# Patient Record
Sex: FEMALE | Race: ASIAN | Employment: OTHER | URBAN - METROPOLITAN AREA
[De-identification: names, ages, dates, MRNs, and addresses within clinical notes are randomized per-mention and may not be internally consistent; named-entity substitution may affect disease eponyms.]

---

## 2017-07-17 ENCOUNTER — MEDICAL CORRESPONDENCE (OUTPATIENT)
Dept: HEALTH INFORMATION MANAGEMENT | Facility: CLINIC | Age: 57
End: 2017-07-17

## 2018-07-11 ENCOUNTER — TRANSFERRED RECORDS (OUTPATIENT)
Dept: HEALTH INFORMATION MANAGEMENT | Facility: CLINIC | Age: 58
End: 2018-07-11

## 2018-07-18 DIAGNOSIS — H35.52 RETINITIS PIGMENTOSA: Primary | ICD-10-CM

## 2018-07-24 ENCOUNTER — HEALTH MAINTENANCE LETTER (OUTPATIENT)
Age: 58
End: 2018-07-24

## 2018-07-24 ENCOUNTER — ALLIED HEALTH/NURSE VISIT (OUTPATIENT)
Dept: OPHTHALMOLOGY | Facility: CLINIC | Age: 58
End: 2018-07-24
Attending: OPHTHALMOLOGY
Payer: COMMERCIAL

## 2018-07-24 DIAGNOSIS — H35.52 RETINITIS PIGMENTOSA: ICD-10-CM

## 2018-07-24 PROCEDURE — 40000269 ZZH STATISTIC NO CHARGE FACILITY FEE: Mod: ZF

## 2018-07-24 PROCEDURE — 92275 FFERG OU (BOTH EYES): CPT | Mod: ZF

## 2018-07-24 RX ORDER — IBUPROFEN 600 MG/1
600 TABLET, FILM COATED ORAL PRN
COMMUNITY

## 2018-07-24 RX ORDER — MECLIZINE HYDROCHLORIDE 25 MG/1
25 TABLET ORAL PRN
COMMUNITY
Start: 2018-04-19

## 2018-07-24 RX ORDER — ASPIRIN 81 MG/1
81 TABLET ORAL DAILY
COMMUNITY
Start: 2018-04-19

## 2018-07-24 RX ORDER — LOSARTAN POTASSIUM 25 MG/1
25 TABLET ORAL DAILY
COMMUNITY
Start: 2018-04-19

## 2018-07-24 RX ORDER — CYCLOBENZAPRINE HCL 5 MG
5-10 TABLET ORAL
COMMUNITY
Start: 2018-05-17

## 2018-07-24 RX ORDER — OMEPRAZOLE 40 MG/1
40 CAPSULE, DELAYED RELEASE ORAL
COMMUNITY
Start: 2018-01-08

## 2018-07-24 ASSESSMENT — VISUAL ACUITY
OS_SC: 20/150
OD_SC: 20/20
OS_SC+: -
OD_SC+: -
METHOD: SNELLEN - LINEAR

## 2018-07-24 NOTE — PROGRESS NOTES
"2018    STANDARD ERG REPORT    Referring:  Dr. Adarsh Romero [facility]     RE:  Lona PARKS Cha  MRN:  [id number]  :  1960    ERG Date:  2018    CLINICAL HISTORY: Referred by Dr. Adarsh Romero to Dr. Doris Arcos/[facility] for retinal evaluation of extensive retinal scarring both eyes.  Here for ffERG because of \"retinal pigmentary changes concerning for retinitis pigmentosa\".   Pt states that she has had dizziness with headaches and nausea and vomiting x 3wks now.  She has been prescribed Meclizine which helps only if she can take the med as soon as she thinks she is feeling dizzy.  Pt is vague historian but believes vision was \"good\" and only recently noted she could not see out of left eye. No FHx eye probs.     IMPRESSION: Asymmetric Cone-vanda dystrophy                Visual Acuity Right Eye : 20/20-       w/o gls    Visual Acuity Left Eye : 20/150-, PHNI      w/o gls                         ALL AVERAGED  Data for Full-Field ERG Right Eye   Left Eye    Dark-Adapted Patient Normal Patient   0.01 ERG (vanda) amplitude( v) 121 101.6-336.1 97   0.01 ERG (vanda) implicit time(ms) 84.9 73.2-106.2 94.9   MMMMM      3.0 ERG (combined) a-wave amplitude( v) -130 -218.0 to -67.8 -99   3.0 ERG (combined) a-wave implicit time(ms) 22.5 13.0-22.8 23.3   3.0 ERG (combined) b-wave amplitude( v) 200* 124.1-414.8 134*   3.0 ERG (combined) b-wave implicit time(ms) 60.7* 29.7-52.8 56.6*   MMMMM      3.0 Oscillatory Potentials   Present     Light-Adapted      3.0 Flicker (30-Hz) amplitude( v) 77 77.8-138.3 54   3.0 Flicker (30-Hz) implicit time(ms) 30.8 23.9-28.3 31.6         3.0 ERG (cone) a-wave amplitude( v) -34 -59.4 to -9.1 -21   3.0 ERG (cone) a-wave implicit time(ms) 15 16.2-18.0 15   3.0 ERG (cone) b-wave amplitude( v) 80 52.7-185.6 70   3.0 ERG (cone) b-wave implicit time(ms) 32.5 26.2-31.3 34.9      *=manipulated cursors    INTERPRETATION:      This full field " electroretinogram was performed according to ISCEV standards using Appy CoupleION E3 system and DTL fiber recording electrodes. The patient tolerated the testing well.  The waveforms are fairly reproducible and well formed. The normative values provided above represent the 95% confidence limits for a normal individual the age of the patient. The patient s responses are averaged. There is some asymmetry of the responses in between both eyes with right eye having greater amplitude responses compared to left eye.    In dark adapted conditions, the vanda-specific responses have normal amplitude and normal implicit time for right eye and decreased amplitude with normal implicit time left eye.  The maximal response, a combined vanda and cone responses, have normal amplitudes in both eyes and the implicit time is delayed for the b-wave both eyes and a-wave left eye.  With a higher intensity flash, the responses improve, but persistently reduced in both eyes.  The bright flash (scotopic 10.0) response is not electronegative. The oscillatory potentials are present bilaterally.      In light adapted conditions, the 30-Hz flicker responses have a decreased amplitude in both eyes and the implicit times are delayed.    The single photopic response has normal amplitude both eyes and and the implicit times are abnormal in both eyes.    Conclusion:  This electroretinogram is abnormal, showing moderate loss of cone/vanda function. The etiology for this is unknown and could be consistent with cone-vanda retinal dystrophy. The differential diagnosis includes: post-inflammatory retinopathy, toxic retinopathy and Autoimmune Retinopathy. Consideration could be given to drawing blood for the retinal dystrophy panel, with hopes of determining the mutations accounting for this retinal dystrophy. Clinical correlation is recommended.    A  repeat electroretinogram could be considered in 1-2 years or earlier if the clinical situation changes.     Tanika George,  thank you for the opportunity to provide electrophysiologic services for this patient.  Please do not hesitate to call if there should be any questions regarding these results.    Magaly Nunez MD     Retina Service   Department of Ophthalmology & Visual Neurosciences   [facility]  Phone: [phone]   Fax: [phone]

## 2018-07-24 NOTE — MR AVS SNAPSHOT
After Visit Summary   7/24/2018    Lona PARKS Cha    MRN: [id number]           Patient Information     Date Of Birth          1960        Visit Information        Provider Department      7/24/2018 8:15 AM PHONE, ; [facility] EYE ELECTRODIAGNOSTIC [facility]        Today's Diagnoses     Retinitis pigmentosa           Follow-ups after your visit        Who to contact     Please call your clinic at [phone] to:    Ask questions about your health    Make or cancel appointments    Discuss your medicines    Learn about your test results    Speak to your doctor            Additional Information About Your Visit        MyChart Information     FirstString gives you secure access to your electronic health record. If you see a primary care provider, you can also send messages to your care team and make appointments. If you have questions, please call your primary care clinic.  If you do not have a primary care provider, please call [phone] and they will assist you.      FirstString is an electronic gateway that provides easy, online access to your medical records. With FirstString, you can request a clinic appointment, read your test results, renew a prescription or communicate with your care team.     To access your existing account, please contact your [facility] or call [phone] for assistance.        Care EveryWhere ID     This is your Care EveryWhere ID. This could be used by other organizations to access your [place] medical records  [id number]         Blood Pressure from Last 3 Encounters:   No data found for BP    Weight from Last 3 Encounters:   No data found for Wt              We Performed the Following     FFERG OU (both eyes)        Primary Care Provider Office Phone # Fax #    Gordy Maloney [phone]       [facility] [address]                                     [facility] [address]        Equal Access to Services      YULI SHARMA : Hadii aad jimena malissa Murdock, wasusanda luqadaha, qaybta kaalmada deanthaniariki, briana bricenopericotoby davis. So [facility] [phone].    ATENCIÓN: Si habla español, tiene a crawley disposición servicios gratuitos de asistencia lingüística. Llame al [phone].    We comply with applicable federal civil rights laws and [place] laws. We do not discriminate on the basis of race, color, national origin, age, disability, sex, sexual orientation, or gender identity.            Thank you!     Thank you for choosing EYE CLINIC  for your care. Our goal is always to provide you with excellent care. Hearing back from our patients is one way we can continue to improve our services. Please take a few minutes to complete the written survey that you may receive in the mail after your visit with us. Thank you!             Your Updated Medication List - Protect others around you: Learn how to safely use, store and throw away your medicines at www.disposemymeds.org.      Notice  As of 7/24/2018 10:41 AM    You have not been prescribed any medications.

## 2018-07-24 NOTE — LETTER
"2018    STANDARD ERG REPORT    Referring:  Dr. Adarsh [facility]     RE:  Lona CERON Cha  MRN:  [id number]  :  1960    ERG Date:  2018    CLINICAL HISTORY: Referred by Dr. Adarsh Romero [facility] evaluation of extensive retinal scarring both eyes.  Here for ffERG because of \"retinal pigmentary changes concerning for retinitis pigmentosa\".   Pt states that she has had dizziness with headaches and nausea and vomiting x 3wks now.  She has been prescribed Meclizine which helps only if she can take the med as soon as she thinks she is feeling dizzy.  Pt is vague historian but believes vision was \"good\" and only recently noted she could not see out of left eye. No FHx eye probs.     IMPRESSION:  Asymmetric cone-vanda dystrophy                Visual Acuity Right Eye: 20/20-       w/o gls    Visual Acuity Left Eye:              20/150-, PHNI      w/o gls                                                ALL AVERAGED  Data for Full-Field ERG Right Eye   Left Eye    Dark-Adapted Patient Normal Patient   0.01 ERG (vanda) amplitude( v) 121 101.6-336.1 97   0.01 ERG (vanda) implicit time(ms) 84.9 73.2-106.2 94.9   MMMMM      3.0 ERG (combined) a-wave amplitude( v) -130 -218.0 to -67.8 -99   3.0 ERG (combined) a-wave implicit time(ms) 22.5 13.0-22.8 23.3   3.0 ERG (combined) b-wave amplitude( v) 200* 124.1-414.8 134*   3.0 ERG (combined) b-wave implicit time(ms) 60.7* 29.7-52.8 56.6*   MMMMM      3.0 Oscillatory Potentials  Present  Present Present     Light-Adapted      3.0 Flicker (30-Hz) amplitude( v) 77 77.8-138.3 54   3.0 Flicker (30-Hz) implicit time(ms) 30.8 23.9-28.3 31.6         3.0 ERG (cone) a-wave amplitude( v) -34 -59.4 to -9.1 -21   3.0 ERG (cone) a-wave implicit time(ms) 15 16.2-18.0 15   3.0 ERG (cone) b-wave amplitude( v) 80 52.7-185.6 70   3.0 ERG (cone) b-wave implicit time(ms) 32.5 26.2-31.3 34.9                 " *=manipulated cursors    INTERPRETATION:  This full-field electroretinogram was performed according to ISCEV standards using the HardDronesION E3 system and DTL fiber-recording electrodes. The patient tolerated the testing well. The waveforms are fairly reproducible and well formed. The normative values provided above represent the 95% confidence limits for a normal individual the age of the patient. The patient s responses are averaged. There is some asymmetry of the responses in between both eyes with right eye having greater amplitude responses compared to left eye.    In dark-adapted conditions, the vanda-specific responses have normal amplitude and normal implicit time for right eye and decreased amplitude with normal implicit time left eye. The maximal response, a combined vanda and cone response, has normal amplitudes in both eyes and the implicit time is delayed for the b-wave both eyes and a-wave left eye. With a higher intensity flash, the responses improve but are persistently reduced in both eyes. The bright flash (scotopic 10.0) response is not electronegative. The oscillatory potentials are present bilaterally.      In light-adapted conditions, the 30-Hz flicker responses have a decreased amplitude in both eyes and the implicit times are delayed.  The single photopic response has normal amplitude both eyes and the implicit times are abnormal in both eyes.    Conclusion: This electroretinogram is abnormal, showing moderate loss of cone/vanda function. The etiology for this is unknown and could be consistent with cone-vanda retinal dystrophy. The differential diagnoses include: Post-inflammatory retinopathy, toxic retinopathy, and autoimmune retinopathy. Consideration could be given to drawing blood for the retinal dystrophy panel, with hopes of determining the mutations accounting for this retinal dystrophy. Clinical correlation is recommended.    A  repeat electroretinogram could be considered in 1-2 years, or  earlier if the clinical situation changes.     Doris, thank you for the opportunity to provide electrophysiologic services for this patient.  Please do not hesitate to call if there should be any questions regarding these results.    Sincerely,    Magaly Nunez MD     Retina Service   Department of Ophthalmology & Visual Neurosciences   [facility]  Phone: [phone]   Fax: [phone]

## 2018-07-24 NOTE — NURSING NOTE
"Referred by Dr. Adarsh Romero to Dr. Doris Arcos/[facility] for retinal evaluation of extensive retinal scarring both eyes.  See Outside Outpt notes.  Here for ffERG because of \"retinal pigmentary changes concerning for retinitis pigmentosa\".   No prev gls and only using OTC readers at this time x1 1/2 yrs.  Dr. Romero did give gls rx but pt unsure if gls will help her visual problems.  Pt states that she has had dizziness w/headaches and nausea and vomiting x 3wks now.  She has been rxd Meclizine which helps only if she can take the med as soon as she thinks she is feeling dizzy.  Otherwise, episodes can last 3-4 days.  Pt is vague historian but believes vision was \"good\" and only recently noted she could not see out of left eye.  No probs w/renewing DL.  Parents  when she was very young and states -FHx eye probs.  Accompanied by .  Declined [facility]  and form signed and sent to HIM.  Signed up for [employer] today.  No f/u yet scheduled w/Dr. Arcos; will await results.  "

## 2018-10-10 DIAGNOSIS — H35.52 CONE-ROD DYSTROPHY: Primary | ICD-10-CM

## 2018-10-11 ENCOUNTER — TRANSFERRED RECORDS (OUTPATIENT)
Dept: HEALTH INFORMATION MANAGEMENT | Facility: CLINIC | Age: 58
End: 2018-10-11

## 2018-10-11 ENCOUNTER — MEDICAL CORRESPONDENCE (OUTPATIENT)
Dept: HEALTH INFORMATION MANAGEMENT | Facility: CLINIC | Age: 58
End: 2018-10-11

## 2018-10-11 ENCOUNTER — OFFICE VISIT (OUTPATIENT)
Dept: OPHTHALMOLOGY | Facility: CLINIC | Age: 58
End: 2018-10-11
Attending: OPHTHALMOLOGY
Payer: COMMERCIAL

## 2018-10-11 DIAGNOSIS — H35.52 CONE-ROD DYSTROPHY: Primary | ICD-10-CM

## 2018-10-11 DIAGNOSIS — H35.50 RETINAL DYSTROPHY: ICD-10-CM

## 2018-10-11 PROCEDURE — 92134 CPTRZ OPH DX IMG PST SGM RTA: CPT | Mod: ZF | Performed by: OPHTHALMOLOGY

## 2018-10-11 PROCEDURE — 92235 FLUORESCEIN ANGRPH MLTIFRAME: CPT | Mod: ZF | Performed by: OPHTHALMOLOGY

## 2018-10-11 PROCEDURE — 92083 EXTENDED VISUAL FIELD XM: CPT | Mod: ZF | Performed by: OPHTHALMOLOGY

## 2018-10-11 PROCEDURE — G0463 HOSPITAL OUTPT CLINIC VISIT: HCPCS | Mod: 25,ZF

## 2018-10-11 PROCEDURE — 92250 FUNDUS PHOTOGRAPHY W/I&R: CPT | Mod: ZF | Performed by: OPHTHALMOLOGY

## 2018-10-11 RX ORDER — ACETAMINOPHEN 500 MG/1
TABLET ORAL
Refills: 0 | COMMUNITY
Start: 2018-08-31

## 2018-10-11 ASSESSMENT — VISUAL ACUITY
CORRECTION_TYPE: GLASSES
OS_SC: 20/300
OD_PH_SC: 20/25-2
OS_PH_SC: 20/250
METHOD: SNELLEN - LINEAR
OD_SC: 20/40

## 2018-10-11 ASSESSMENT — CONF VISUAL FIELD
OS_SUPERIOR_TEMPORAL_RESTRICTION: 1
OD_NORMAL: 1
OS_SUPERIOR_NASAL_RESTRICTION: 1
OS_INFERIOR_TEMPORAL_RESTRICTION: 1
METHOD: COUNTING FINGERS
OS_INFERIOR_NASAL_RESTRICTION: 1

## 2018-10-11 ASSESSMENT — TONOMETRY
OD_IOP_MMHG: 21
OS_IOP_MMHG: 18
IOP_METHOD: TONOPEN

## 2018-10-11 ASSESSMENT — EXTERNAL EXAM - LEFT EYE: OS_EXAM: NORMAL

## 2018-10-11 ASSESSMENT — EXTERNAL EXAM - RIGHT EYE: OD_EXAM: NORMAL

## 2018-10-11 ASSESSMENT — SLIT LAMP EXAM - LIDS
COMMENTS: DERMATOCHALASIS
COMMENTS: DERMATOCHALASIS

## 2018-10-11 NOTE — MR AVS SNAPSHOT
After Visit Summary   10/11/2018    Lona PARKS Cha    MRN: [id number]           Patient Information     Date Of Birth          1960        Visit Information        Provider Department      10/11/2018 8:15 AM Magaly Nunez MD [facility]        Today's Diagnoses     Cone-vanda dystrophy    -  1    Retinal dystrophy           Follow-ups after your visit        Additional Services     Low Vision (OT) Referral       If you have not heard from the scheduling office within 2 business days, please call [phone] for all locations, with the exception of [place], please call [phone] and [place], please call [phone].    Please be aware that coverage of these services is subject to the terms and limitations of your health insurance plan.  Call member services at your health plan with any benefit or coverage questions.            Ophthalmology Genetic Clinic Referral       Retinitis pigmentosa                  Follow-up notes from your care team     Return in about 4 months (around 2/11/2019) for OCT AF.      Future tests that were ordered for you today     Open Future Orders        Priority Expected Expires Ordered    Low Vision (OT) Referral Routine  10/11/2019 10/11/2018    Ophthalmology Genetic Clinic Referral Routine  10/11/2019 10/11/2018    M Tuberculosis by Quantiferon Routine  1/9/2019 10/11/2018    Angiotensin converting enzyme Routine  1/9/2019 10/11/2018            Who to contact     Please call your clinic at [phone] to:    Ask questions about your health    Make or cancel appointments    Discuss your medicines    Learn about your test results    Speak to your doctor            Additional Information About Your Visit        MyChart Information     [employer]any[employer] gives you secure access to your electronic health record. If you see a primary care provider, you can also send messages to your care team and make appointments. If you have questions, please call your primary care  clinic.  If you do not have a primary care provider, please call [phone] and they will assist you.      [employer] is an electronic gateway that provides easy, online access to your medical records. With [employer], you can request a clinic appointment, read your test results, renew a prescription or communicate with your care team.     To access your existing account, please contact your [facility] or call [phone] for assistance.        Care EveryWhere ID     This is your Care EveryWhere ID. This could be used by other organizations to access your [place] medical records  [id number]         Blood Pressure from Last 3 Encounters:   No data found for BP    Weight from Last 3 Encounters:   No data found for Wt              We Performed the Following     Fluorescein Angiography OU (both eyes)     Fundus Autofluorescence Image (FAF) OU (both eyes)     Fundus Photos OU (both eyes)     Atkinson VF OU     OCT Retina Spectralis OU (both eyes)        Primary Care Provider Office Phone # Fax #    Gordy Maloney [phone]       [facility] [address]                                     [facility] [address]        Equal Access to Services     YULI SHARMA : Hadii aad ku hadasho Soomaali, waaxda luqadaha, qaybta kaalmada adecarina, briana davis. So [facility] [phone].    ATENCIÓN: Si habla español, tiene a crawley disposición servicios gratuitos de asistencia lingüística. Claudia al [phone].    We comply with applicable federal civil rights laws and [place] laws. We do not discriminate on the basis of race, color, national origin, age, disability, sex, sexual orientation, or gender identity.            Thank you!     Thank you for choosing EYE CLINIC  for your care. Our goal is always to provide you with excellent care. Hearing back from our patients is one way we can continue to improve our services. Please take a few  minutes to complete the written survey that you may receive in the mail after your visit with us. Thank you!             Your Updated Medication List - Protect others around you: Learn how to safely use, store and throw away your medicines at www.disposemymeds.org.          This list is accurate as of 10/11/18  5:30 PM.  Always use your most recent med list.                   Brand Name Dispense Instructions for use Diagnosis    aspirin 81 MG EC tablet      Take 81 mg by mouth daily        CENTRUM SILVER 50+WOMEN PO      Take 1 tablet by mouth daily        cyclobenzaprine 5 MG tablet    FLEXERIL     Take 5-10 mg by mouth nightly as needed for other Take when having trouble falling asleep.        HM PAIN RELIEF EXTRA STRENGTH 500 MG tablet   Generic drug:  acetaminophen      TAKE 1 2 TABLETS  500 1,000 MG  BY MOUTH THREE TIMES A DAY AS NEEDED FOR PAIN FEVER        ibuprofen 600 MG tablet    ADVIL/MOTRIN     Take 600 mg by mouth as needed for pain Use for body ache and pain d/t carpal tunnel and bad right hip and general body aches.        losartan 25 MG tablet    COZAAR     Take 25 mg by mouth daily        meclizine 25 MG tablet    ANTIVERT     Take 25 mg by mouth as needed for dizziness, headaches, nausea or vomiting        metFORMIN 1000 MG tablet    GLUCOPHAGE     Take 1,000 mg by mouth 2 times daily (with meals)        omeprazole 40 MG capsule    priLOSEC     Take 40 mg by mouth daily (with breakfast)

## 2018-10-11 NOTE — LETTER
10/11/2018       RE: Lona PARKS Cha  [address]  [place] [address]     Dear Doris,    Thank you for referring your patient, Lona PARKS Cha, to the EYE CLINIC at [facility]. Please see a copy of my visit note below.    CC: Retinitis pigmentosa referral - initial visit    HPI: Lona PARKS Cha is a  58 year old year-old patient with history of Diabetes mellitus and HTN who presents from Dr. Henriquez for evaluation of retinal dystrophy. Patient reports decreased vision (left worse than right) for the past 10 months. Reports vision was normal prior to this.   Reports nyctalopia which has been noticeable and worse this year. Reports flashes and floaters which also started around months ago. Reports some weight loss (~10 lbs) since Feb 2018. Reports some fatigue and tiredness. Reports vertigo.    Patient is unaware of any family history of any eye problems.  Has seven kids, no vision problems in kids so far.  No history of cancer or tumors.  No history of seizures or HCQ use.    Optos image consistent with exam    Goldmann visual field (GVF)  Right eye: constricted visual field to 30 degrees   Left eye: severely constricted visual field to less than 10 degrees    FfERG: Abnormal with moderate loss of cone/vanda function    OCT   RE: Retinal thinning with loss of ellipsoid zone, preserved sub foveally  LE: Severe retinal thinning, outer retinal loss including sub foveal area    Autofluorescence:  Right eye:  Large areas of hypoautofluorescence peripapillary and along the inferior arcade, fine area of hyperautofluorescence in the macula with patchy hypo/hyper AF   Left eye: Large areas of hypoautofluorescence peripapillary and along the superior and inferior arcade, patchy hypo/hyper ring around the fovea, patchy hypo/hyper Autofluorescence elsewhere    fluorescein angiography 10-11-18  Right eye: normal AV and choroidal filling; dark choroid; PP and along the inferior temporal arcade late  hyperfluorescence consistent with window's defects  Left eye: normal AV and choroidal filling; dark choroid; PP and macula late hyperfluorescence consistent with window's defects. Central foveal hypofluorescence with bull's eye appearance     Assessment & Plan:  1. Retinal dystrophy, both eyes  differential diagnosis: cone-vanda dystrophy. Stargards, Retinitis pigmentosa; inverse Retinitis pigmentosa.    - plan for RPE65 genetic test today    - Establish care with genetic counselor- consider NGS    -  recommend Low vision with Melanie Mckeon   - given late onset of symptoms ( per patient in the past year) will plan for basic lab work up for FTA- ABS, TB quantiferon, ACE to rule out other infectious or inflammatory conditions, although unlikely     Follow up in approximately 4 months with Optical Coherence Tomography, Autofluorescence and to discuss results     Arian Todd MD  PGY-3 Ophthalmology    ATTESTATION   Attending Attestation: Complete documentation of historical and exam elements from today's encounter can be found in the full encounter summary report (not reduplicated in this progress note).  I personally obtained the chief complaint(s) and history of present illness.  I confirmed and edited as necessary the review of systems, past medical/surgical history, family history, social history, and examination findings as documented by others; and I examined the patient myself.  I personally reviewed the relevant tests, images, and reports as documented above.  I formulated and edited as necessary the assessment and plan and discussed the findings and management plan with the patient and family. Magaly Nunez MD    Again, thank you for allowing me to participate in the care of your patient.      Sincerely,    Magaly Nunez MD     Retina Service   Department of Ophthalmology and Visual Neurosciences   [facility]  Phone:  [phone]   Fax:  [phone]

## 2018-10-11 NOTE — NURSING NOTE
Chief Complaints and History of Present Illnesses   Patient presents with     Retinal Evaluation     Pt sent for evaluation for Retinal Dystrophy by Dr. Doris Henriquez     HPI    Symptoms:     Floaters   Flashes   No redness   Tearing   Dryness         Do you have eye pain now?:  No      Comments:  Pt sent for evaluation for Retinal Dystrophy BE by Dr. Doris Henriquez. Pt feels like vision is getting worse and worse LE>RE x 2 months. Pt does note some intermittent flashing lights floaters in vision BE x few months (just this year). Pt also c/o intermittent dryness and tearing (especially when the wind hits the eyes) x new just starting this year.     Most current A1C pt does not know, unable to pull up    Kamryn Venegas, [facility] 8:44 AM October 11, 2018

## 2018-10-11 NOTE — PROGRESS NOTES
CC: Retinitis pigmentosa referral - initial visit    HPI: May N Betsy is a  58 year old year-old patient with history of Diabetes mellitus and HTN who presents from Dr. Henriquez for evaluation of retinal dystrophy. Patient reports decreased vision (left worse than right) for the past 10 months. Reports vision was normal prior to this.   Reports nyctalopia which has been noticeable and worse this year. Reports flashes and floaters which also started around months ago. Reports some weight loss (~10 lbs) since Feb 2018. Reports some fatigue and tiredness. Reports vertigo.    Patient is unaware of any family history of any eye problems.  Has seven kids, no vision problems in kids so far.  No history of cancer or tumors.  No history of seizures or HCQ use.    Optos image consistent with exam    Goldmann visual field (GVF)  Right eye: constricted visual field to 30 degrees   Left eye: severely constricted visual field to less than 10 degrees    FfERG: Abnormal with moderate loss of cone/vanda function    OCT   RE: Retinal thinning with loss of ellipsoid zone, preserved sub foveally  LE: Severe retinal thinning, outer retinal loss including sub foveal area    Autofluorescence:  Right eye:  Large areas of hypoautofluorescence peripapillary and along the inferior arcade, fine area of hyperautofluorescence in the macula with patchy hypo/hyper AF   Left eye: Large areas of hypoautofluorescence peripapillary and along the superior and inferior arcade, patchy hypo/hyper ring around the fovea, patchy hypo/hyper Autofluorescence elsewhere    fluorescein angiography 10-11-18  Right eye: normal AV and choroidal filling; dark choroid; PP and along the inferior temporal arcade late hyperfluorescence consistent with window's defects  Left eye: normal AV and choroidal filling; dark choroid; PP and macula late hyperfluorescence consistent with window's defects. Central foveal hypofluorescence with bull's eye appearance     Assessment &  Plan:  1. Retinal dystrophy, both eyes  differential diagnosis: cone-vanda dystrophy. Stargards, Retinitis pigmentosa; inverse Retinitis pigmentosa.    - plan for RPE65 genetic test today    - Establish care with genetic counselor- consider NGS    -  recommend Low vision with Melanie Mckeon   - given late onset of symptoms ( per patient in the past year) will plan for basic lab work up for FTA- ABS, TB quantiferon, ACE to rule out other infectious or inflammatory conditions, although unlikely     Follow up in approximately 4 months with Optical Coherence Tomography, Autofluorescence and to discuss results     Arian Todd MD  PGY-3 Ophthalmology  ~~~~~~~~~~~~~~~~~~~~~~~~~~~~~~~~~~   Complete documentation of historical and exam elements from today's encounter can be found in the full encounter summary report (not reduplicated in this progress note).  I personally obtained the chief complaint(s) and history of present illness.  I confirmed and edited as necessary the review of systems, past medical/surgical history, family history, social history, and examination findings as documented by others; and I examined the patient myself.  I personally reviewed the relevant tests, images, and reports as documented above.  I personally reviewed the ophthalmic test(s) associated with this encounter, agree with the interpretation(s) as documented by the resident/fellow, and have edited the corresponding report(s) as necessary.   I formulated and edited as necessary the assessment and plan and discussed the findings and management plan with the patient and family    Magaly Nunez MD   of Ophthalmology.  Retina Service   Department of Ophthalmology and Visual Neurosciences   [facility]  Phone: [phone]   Fax: [phone]

## 2019-02-14 DIAGNOSIS — H35.50 RETINAL DYSTROPHY: Primary | ICD-10-CM

## 2019-03-13 ENCOUNTER — OFFICE VISIT (OUTPATIENT)
Dept: OPHTHALMOLOGY | Facility: CLINIC | Age: 59
End: 2019-03-13
Attending: OPHTHALMOLOGY
Payer: COMMERCIAL

## 2019-03-13 ENCOUNTER — OFFICE VISIT (OUTPATIENT)
Dept: OPHTHALMOLOGY | Facility: CLINIC | Age: 59
End: 2019-03-13
Attending: GENETIC COUNSELOR, MS
Payer: COMMERCIAL

## 2019-03-13 DIAGNOSIS — H35.50 RETINAL DYSTROPHY: ICD-10-CM

## 2019-03-13 DIAGNOSIS — H35.50 RETINAL DYSTROPHY: Primary | ICD-10-CM

## 2019-03-13 DIAGNOSIS — H35.52 CONE-ROD DYSTROPHY: ICD-10-CM

## 2019-03-13 PROCEDURE — G0463 HOSPITAL OUTPT CLINIC VISIT: HCPCS | Mod: ZF

## 2019-03-13 PROCEDURE — 92134 CPTRZ OPH DX IMG PST SGM RTA: CPT | Mod: ZF | Performed by: OPHTHALMOLOGY

## 2019-03-13 PROCEDURE — 96040 ZZH GENETIC COUNSELING, EACH 30 MINUTES: CPT | Mod: ZF | Performed by: GENETIC COUNSELOR, MS

## 2019-03-13 PROCEDURE — 92250 FUNDUS PHOTOGRAPHY W/I&R: CPT | Mod: ZF | Performed by: OPHTHALMOLOGY

## 2019-03-13 ASSESSMENT — EXTERNAL EXAM - LEFT EYE: OS_EXAM: NORMAL

## 2019-03-13 ASSESSMENT — VISUAL ACUITY
OD_PH_SC: 20/30
OS_PH_SC: 20/150
METHOD: SNELLEN - LINEAR
OD_SC: 20/40
OS_SC: 20/300

## 2019-03-13 ASSESSMENT — CONF VISUAL FIELD
OD_INFERIOR_NASAL_RESTRICTION: 3
OS_INFERIOR_NASAL_RESTRICTION: 3
OS_INFERIOR_TEMPORAL_RESTRICTION: 1
OS_SUPERIOR_TEMPORAL_RESTRICTION: 3

## 2019-03-13 ASSESSMENT — TONOMETRY
IOP_METHOD: TONOPEN
OD_IOP_MMHG: 17
OS_IOP_MMHG: 15

## 2019-03-13 ASSESSMENT — SLIT LAMP EXAM - LIDS
COMMENTS: DERMATOCHALASIS
COMMENTS: DERMATOCHALASIS

## 2019-03-13 ASSESSMENT — EXTERNAL EXAM - RIGHT EYE: OD_EXAM: NORMAL

## 2019-03-13 NOTE — PROGRESS NOTES
CC: Retinitis pigmentosa referral - initial visit    HPI: May N Betsy is a  58 year old year-old patient with history of Diabetes mellitus and HTN who presents from Dr. Henriquez for evaluation of retinal dystrophy. Patient reports decreased vision (left worse than right) for the past 10 months. Reports vision was normal prior to this.   Reports nyctalopia which has been noticeable and worse this year. Reports flashes and floaters which also started around months ago. Reports some weight loss (~10 lbs) since Feb 2018. Reports some fatigue and tiredness. Reports vertigo.    Patient is unaware of any family history of any eye problems.  Has seven kid  First kid had history of possible Rb. Patient reports L eye was removed  No history of cancer or tumors.  No history of seizures or HCQ use.    Optos image consistent with exam    Goldmann visual field (GVF)  Right eye: constricted visual field to 30 degrees   Left eye: severely constricted visual field to less than 10 degrees    FfERG: Abnormal with moderate loss of cone/vanda function    OCT   3-13-19  RE: Retinal thinning with loss of ellipsoid zone, preserved sub foveally  LE: Severe retinal thinning, outer retinal loss including sub foveal area    Autofluorescence:  3-13-19  Right eye:  Large areas of hypoautofluorescence peripapillary and along the inferior arcade, fine area of hyperautofluorescence in the macula with patchy hypo/hyper AF   Left eye: Large areas of hypoautofluorescence peripapillary and along the superior and inferior arcade, patchy hypo/hyper ring around the fovea, patchy hypo/hyper Autofluorescence elsewhere    Stable autofluorescence     fluorescein angiography 10-11-18  Right eye: normal AV and choroidal filling; dark choroid; PP and along the inferior temporal arcade late hyperfluorescence consistent with window's defects  Left eye: normal AV and choroidal filling; dark choroid; PP and macula late hyperfluorescence consistent with window's defects.  Central foveal hypofluorescence with bull's eye appearance     Assessment & Plan:  1. Retinal dystrophy, both eyes  differential diagnosis: cone-vanda dystrophy. Stargards, Retinitis pigmentosa; inverse Retinitis pigmentosa.    - plan for RPE65 genetic test today    - Establish care with genetic counselor- consider NGS    -  recommend Low vision with Melanie Mckeon   - given late onset of symptoms ( per patient in the past year) will plan for basic lab work up for FTA- ABS, TB quantiferon, ACE to rule out other infectious or inflammatory conditions, although unlikely. These tests were ordered before but not performed    Follow up in approximately 4 months with Optical Coherence Tomography, Autofluorescence and to discuss results       ~~~~~~~~~~~~~~~~~~~~~~~~~~~~~~~~~~   Complete documentation of historical and exam elements from today's encounter can be found in the full encounter summary report (not reduplicated in this progress note).  I personally obtained the chief complaint(s) and history of present illness.  I confirmed and edited as necessary the review of systems, past medical/surgical history, family history, social history, and examination findings as documented by others; and I examined the patient myself.  I personally reviewed the relevant tests, images, and reports as documented above.  I personally reviewed the ophthalmic test(s) associated with this encounter, agree with the interpretation(s) as documented by the resident/fellow, and have edited the corresponding report(s) as necessary.   I formulated and edited as necessary the assessment and plan and discussed the findings and management plan with the patient and family    Magaly Nunez MD   of Ophthalmology.  Retina Service   Department of Ophthalmology and Visual Neurosciences   [facility]  Phone: [phone]   Fax: [phone]

## 2019-03-13 NOTE — NURSING NOTE
Chief Complaints and History of Present Illnesses   Patient presents with     Follow Up     5 month follow up Retinal dystrophy, both eyes     Chief Complaint(s) and History of Present Illness(es)     Follow Up     Associated symptoms: Negative for flashes, floaters, eye pain, dryness and redness    Comments: 5 month follow up Retinal dystrophy, both eyes              Comments     Pt states vision is about the same as last visit. No eye pain today.  DM2 BS: 140 this morning.  A1C: good per pt.  No results found for: A1C    Anastacio KAPOOR March 13, 2019 12:20 PM

## 2019-03-13 NOTE — PROGRESS NOTES
"Lona Meyer was seen for a genetic counseling appointment in coordination with Dr. Nunez today given her history of retinal dystrophy.      Pertinent Medical History: May is a 58 year old female with a recent diagnosis of retinal dystrophy. She first began noticing symptoms sometime last summer, and reports her night vision is very poor. Ff ERG was abnormal with moderate loss of cone/vanda function; OCT showed retinal thinning bilaterally, more severe on the left, and autoflourescence showed large areas of hypo-autofluorescence peripapillary.  She also reports that she has insomnia and feels that she has poor mental sharpness and recall. She is otherwise healthy and denies hearing loss, renal abnormalities, or birth defects. See Dr. Nunez's note for additional details.     Family History: A three generation pedigree was obtained today and scanned into the EMR. The following information is significant:  -There is no other family history of retinal changes similar to May. Family history is also negative for birth defects, known genetic conditions, and consanguinity was denied.  -May's son, who is now 40, was diagnosed with \"cancer of the eye\" when he was around 6 months old, and he immediately went in for surgery to have his left eye removed. May does not think retinoblastoma sounds familiar, and she personally believes the diagnosis was made up, and that the doctors were punishing her for having had a child as a teenager, out of wedlock. I did review with May that the age of onset and treatment does sound consistent with RB, but that we would need to review medical records to confirm. This diagnosis and surgery took place in [place], [place]. Her son has some possible mental health concerns now, and does not follow with an ophthalmologist or any other doctor.         Discussion: We reviewed the genetics and inheritance of retinal dystopies, and genetic testing for the condition.     We have around 20,000 genes in " our body, and inherit one copy of each of these genes from our mother, and one from our father, and thus have two copies of each of these genes in every cell of our body. Genes tell our bodies how to grow and develop, help determine traits like eye and hair color, and help protect and make us susceptible to different diseases and genetic conditions, such as retinal dystrophy.     There are hundreds of genes that have been associated with retinal dystrophies. These genes can be inherited in an autosomal recessive, dominant, or X-linked manner. These genes can cause non-syndromic retinal dystrophy, meaning no other symptoms aside from the eye changes are present, or can by syndromic, meaning other symptoms, such as hearing loss, kidney problems, or birth defects can be present.      There are over 300 genes that have been associated with retinal dystrophy. We reviewed that sometimes, physical exam and/or family history can help us narrow down which gene, or group of genes, are more likely to be the answer. However, May has no family history of disease and her symptoms are quite unique and don't fit well into a specific genetic etiology. As such, we would recommend a large gene panel which sequences over 300 genes associated with retinal dysyrophy. This panel will include genes with all types of inheritance patterns, and includes genes associated with both syndromic and non-syndromic forms of retinal dystrophy.     We reviewed that results could come back positive, negative or with a variant of uncertain significance (VUS). We reviewed the likelihood of a VUS is very high when we're analyzing so many genes, and may require further follow-up, such as testing of other family members, depending on the type of variant and which gene it is in.      We reviewed that genetic testing is important for several reasons. First, it would provide a genetic explanation for May's disease, and may alter medical management if a  syndromic cause of retinal dystrophy were identified, or if a mutation were found for a gene which has gene therapy treatment options available. There is currently FDA approved gene therapy for one gene associated with retinal dystrophy, RPE65, with clinical trials currently underway for numerous other genes. If a genetic explanation for disease is identified, it will also allow us to test other family members who may be at risk for the condition, such as May's children or siblings.      May expressed a good understanding of this information, and all questions were answered. Consent for genetic testing was obtained, and blood drawn following today's appointment. Prior authorization will be initiated. If approved, testing will take ~6 weeks from that time.       Plan:  1. Retinal dystrophy gene panel to [facility] - pending prior auth  2. Return pending results of above testing  3. Contact information was provided should any questions arise in the future.     Frannie Gracia MS, [facility]  Licensed Genetic Counselor  [phone]     Approximate Time Spent in Consultation: 30 minutes      CC: Patient / PCP

## 2019-03-14 LAB — T PALLIDUM AB SER QL: NONREACTIVE

## 2019-03-15 LAB
ACE SERPL-CCNC: 15 U/L (ref 9–67)
COPATH REPORT: NORMAL
GAMMA INTERFERON BACKGROUND BLD IA-ACNC: 0.37 IU/ML
M TB IFN-G BLD-IMP: NEGATIVE
M TB IFN-G CD4+ BCKGRND COR BLD-ACNC: 7.54 IU/ML
MITOGEN IGNF BCKGRD COR BLD-ACNC: 0 IU/ML
MITOGEN IGNF BCKGRD COR BLD-ACNC: 0.07 IU/ML

## 2019-04-10 ENCOUNTER — TELEPHONE (OUTPATIENT)
Dept: CONSULT | Facility: CLINIC | Age: 59
End: 2019-04-10

## 2019-04-10 NOTE — TELEPHONE ENCOUNTER
Placed TC and spoke to May re: insurance has denied testing. There are two options: 1) she can pay out of pocket for the testing; however, it is very expensive so this is likely not preferable. 2) she can participate in the My Retina Tracker, and as part of that, have genetic testing completed.     She would like to go through with My Retia Tracker, and I will have Kayla call to help walk May through this process.    All questions were answered.    Frannie Gracia MS, [facility]  Licensed Genetic Counselor  [facility]  (p) [phone]  (f) [phone]

## 2019-04-11 ENCOUNTER — TELEPHONE (OUTPATIENT)
Dept: OPHTHALMOLOGY | Facility: CLINIC | Age: 59
End: 2019-04-11

## 2019-04-11 NOTE — TELEPHONE ENCOUNTER
called pt. Will fill out [email or web address] and call back to schedule a blood or saliva draw to send to [facility].  Aminata Cancino COT 10:01 AM April 11, 2019     ----- Message from Magaly Nunez MD sent at 4/10/2019  4:43 PM CDT -----  Patient got insurance denied  Kayla, could you pls call patient to recommend my retinal tracker?  She has not signed for this    B registry information  You were diagnosed with an Inherited Retinal Disease named retina dystrophy Additional information about your eye condition can be found at the [employer] website http://www.[email or web address]/. You are encouraged to register on this website [email or web address]'[email or web address]/ to keep you updated on the most resent clinical trials and research on your condition. This is a free on-line registry provided by the [employer] as part of its mission to drive the research that will provide prevention, treatments, and cures for people affected by various inherited retinal diseases. The database that is developed on this website is designed to protect your privacy.  After you have registered as a participant, you have the option to invite your retinal healthcare providers (doctor or genetic counselor) to update your records, each time you have an appointment, with your most recent clinical eye examinations and genetic testing results.  If you would like to ask your clinician to add to your retinal health profile, fill out the Clinical Data Input Request form that can be found on the site.

## 2019-07-08 DIAGNOSIS — H35.50 RETINAL DYSTROPHY: Primary | ICD-10-CM

## 2020-03-10 ENCOUNTER — HEALTH MAINTENANCE LETTER (OUTPATIENT)
Age: 60
End: 2020-03-10

## 2020-12-27 ENCOUNTER — HEALTH MAINTENANCE LETTER (OUTPATIENT)
Age: 60
End: 2020-12-27

## 2021-04-24 ENCOUNTER — HEALTH MAINTENANCE LETTER (OUTPATIENT)
Age: 61
End: 2021-04-24

## 2021-05-30 ENCOUNTER — RECORDS - HEALTHEAST (OUTPATIENT)
Dept: ADMINISTRATIVE | Facility: CLINIC | Age: 61
End: 2021-05-30

## 2021-05-31 ENCOUNTER — RECORDS - HEALTHEAST (OUTPATIENT)
Dept: ADMINISTRATIVE | Facility: CLINIC | Age: 61
End: 2021-05-31

## 2021-10-09 ENCOUNTER — HEALTH MAINTENANCE LETTER (OUTPATIENT)
Age: 61
End: 2021-10-09

## 2022-01-29 ENCOUNTER — HEALTH MAINTENANCE LETTER (OUTPATIENT)
Age: 62
End: 2022-01-29

## 2022-03-20 ENCOUNTER — HEALTH MAINTENANCE LETTER (OUTPATIENT)
Age: 62
End: 2022-03-20

## 2022-09-11 ENCOUNTER — HEALTH MAINTENANCE LETTER (OUTPATIENT)
Age: 62
End: 2022-09-11

## 2023-05-06 ENCOUNTER — HEALTH MAINTENANCE LETTER (OUTPATIENT)
Age: 63
End: 2023-05-06